# Patient Record
Sex: MALE | Race: WHITE | NOT HISPANIC OR LATINO | Employment: FULL TIME | URBAN - METROPOLITAN AREA
[De-identification: names, ages, dates, MRNs, and addresses within clinical notes are randomized per-mention and may not be internally consistent; named-entity substitution may affect disease eponyms.]

---

## 2017-11-22 ENCOUNTER — ALLSCRIPTS OFFICE VISIT (OUTPATIENT)
Dept: OTHER | Facility: OTHER | Age: 62
End: 2017-11-22

## 2017-11-22 LAB
BILIRUB UR QL STRIP: NORMAL
CLARITY UR: NORMAL
COLOR UR: NORMAL
GLUCOSE (HISTORICAL): NORMAL
HGB UR QL STRIP.AUTO: NORMAL
KETONES UR STRIP-MCNC: NORMAL MG/DL
LEUKOCYTE ESTERASE UR QL STRIP: NORMAL
NITRITE UR QL STRIP: NORMAL
PH UR STRIP.AUTO: 5.5 [PH]
PROT UR STRIP-MCNC: NORMAL MG/DL
SP GR UR STRIP.AUTO: 1.02
UROBILINOGEN UR QL STRIP.AUTO: 0.2

## 2018-01-15 NOTE — MISCELLANEOUS
Message  GI Reminder Recall [facility]:   Date: 12/19/2016   Dear Haven COBOS:     Review of our records shows you are due for the following: Colonoscopy  Please call the following office to schedule your appointment:   [address] [facility], [address], [place], [address] [place] [address]  [phone]  We look forward to hearing from you!      Sincerely,         Signatures  [facility]    Electronically signed by : Domitila Chaves, ; Dec 19 2016  3:19PM EST                       (Author)

## 2018-01-22 VITALS
WEIGHT: 248 LBS | BODY MASS INDEX: 32.87 KG/M2 | DIASTOLIC BLOOD PRESSURE: 74 MMHG | SYSTOLIC BLOOD PRESSURE: 132 MMHG | HEIGHT: 73 IN

## 2018-11-26 DIAGNOSIS — Z85.46 PERSONAL HISTORY OF MALIGNANT NEOPLASM OF PROSTATE: Primary | ICD-10-CM

## 2018-11-27 LAB — PSA SERPL-MCNC: <0.1 NG/ML

## 2018-12-03 RX ORDER — SIMVASTATIN 40 MG
TABLET ORAL
Refills: 0 | COMMUNITY
Start: 2018-10-25

## 2018-12-03 RX ORDER — SILDENAFIL CITRATE 20 MG/1
TABLET ORAL
COMMUNITY
End: 2022-04-25

## 2018-12-03 RX ORDER — FENOFIBRATE 145 MG/1
TABLET, COATED ORAL
Refills: 0 | COMMUNITY
Start: 2018-10-25

## 2018-12-03 RX ORDER — SERTRALINE HYDROCHLORIDE 100 MG/1
TABLET, FILM COATED ORAL
Refills: 1 | COMMUNITY
Start: 2018-11-20 | End: 2022-04-25

## 2018-12-06 ENCOUNTER — OFFICE VISIT (OUTPATIENT)
Dept: UROLOGY | Facility: MEDICAL CENTER | Age: 63
End: 2018-12-06
Payer: COMMERCIAL

## 2018-12-06 VITALS
SYSTOLIC BLOOD PRESSURE: 120 MMHG | BODY MASS INDEX: 33.46 KG/M2 | DIASTOLIC BLOOD PRESSURE: 70 MMHG | HEART RATE: 69 BPM | HEIGHT: 72 IN | WEIGHT: 247 LBS

## 2018-12-06 DIAGNOSIS — Z85.46 PERSONAL HISTORY OF MALIGNANT NEOPLASM OF PROSTATE: Primary | ICD-10-CM

## 2018-12-06 DIAGNOSIS — Z90.79 HISTORY OF RADICAL PROSTATECTOMY: ICD-10-CM

## 2018-12-06 PROCEDURE — 99214 OFFICE O/P EST MOD 30 MIN: CPT | Performed by: UROLOGY

## 2018-12-06 NOTE — PROGRESS NOTES
Assessment/Plan:      Diagnoses and all orders for this visit:    Personal history of malignant neoplasm of prostate  -     PSA Total, Diagnostic; Future    History of radical prostatectomy    Other orders  -     fenofibrate (TRICOR) 145 mg tablet; TK 1 T PO D  -     sertraline (ZOLOFT) 100 mg tablet; TK 1 T PO D  -     simvastatin (ZOCOR) 40 mg tablet; TK 1 T PO D  -     sildenafil (REVATIO) 20 mg tablet; Take by mouth  -     Omega-3 Fatty Acids (FISH OIL) 645 MG CAPS; Take 1 capsule by mouth daily          Subjective:  No complaints     Patient ID: Shazia Ford is a 59 y o  male  HPI   He is over 6 years (October 2012) after his laparoscopic robotic radical prostatectomy with lymph node dissection for a pT2c pN0, Wilkinson score 9=4+ 5 adenocarcinoma of the prostate  His PSA blood tests have been low since his prostate cancer treatment was completed  He denies any daytime urinary incontinence, however once in a while he does have some leakage at night when he needs to take his neuroleptic medication  He does not have any abnormal sensations were needing to urinate nor does he need to strain or bear down to started urinary stream   He states he has a good appetite and normal bowel function, he denies any weight loss or pain in new locations  He does have some ED problems which have been an issue for him  Review of Systems   Constitutional: Negative  HENT: Negative  Eyes: Negative  Respiratory: Negative  Cardiovascular: Negative  Gastrointestinal: Negative  Endocrine: Negative  Genitourinary: Negative  Musculoskeletal: Negative  Skin: Negative  Allergic/Immunologic: Negative  Neurological: Negative  Hematological: Negative  Psychiatric/Behavioral: Negative  Objective:     Physical Exam   Constitutional: He is oriented to person, place, and time  He appears well-developed and well-nourished  No distress  HENT:   Head: Normocephalic and atraumatic     Nose: Nose normal    Mouth/Throat: Oropharynx is clear and moist    Eyes: Pupils are equal, round, and reactive to light  Conjunctivae and EOM are normal  No scleral icterus  Neck: Normal range of motion  Neck supple  Cardiovascular: Normal rate, regular rhythm, normal heart sounds and intact distal pulses  No murmur heard  Pulmonary/Chest: Effort normal and breath sounds normal  No respiratory distress  He has no wheezes  He has no rales  Abdominal: Soft  Bowel sounds are normal  He exhibits no distension and no mass  There is no tenderness  Genitourinary: Rectum normal    Musculoskeletal: Normal range of motion  He exhibits no edema or tenderness  Lymphadenopathy:     He has no cervical adenopathy  Neurological: He is alert and oriented to person, place, and time  No cranial nerve deficit  Skin: Skin is warm and dry  No rash noted  No erythema  No pallor  Psychiatric: He has a normal mood and affect  His behavior is normal  Judgment and thought content normal    Nursing note and vitals reviewed          PSA from 11/26/2018 is less than 0 1

## 2019-12-04 LAB — PSA SERPL-MCNC: <0.1 NG/ML

## 2019-12-13 ENCOUNTER — OFFICE VISIT (OUTPATIENT)
Dept: UROLOGY | Facility: MEDICAL CENTER | Age: 64
End: 2019-12-13
Payer: COMMERCIAL

## 2019-12-13 VITALS
HEART RATE: 74 BPM | DIASTOLIC BLOOD PRESSURE: 72 MMHG | HEIGHT: 72 IN | BODY MASS INDEX: 35.76 KG/M2 | SYSTOLIC BLOOD PRESSURE: 120 MMHG | WEIGHT: 264 LBS

## 2019-12-13 DIAGNOSIS — R31.29 MICROSCOPIC HEMATURIA: ICD-10-CM

## 2019-12-13 DIAGNOSIS — Z85.46 PERSONAL HISTORY OF MALIGNANT NEOPLASM OF PROSTATE: Primary | ICD-10-CM

## 2019-12-13 LAB
SL AMB  POCT GLUCOSE, UA: NORMAL
SL AMB LEUKOCYTE ESTERASE,UA: NORMAL
SL AMB POCT BILIRUBIN,UA: NORMAL
SL AMB POCT BLOOD,UA: NORMAL
SL AMB POCT CLARITY,UA: CLEAR
SL AMB POCT COLOR,UA: YELLOW
SL AMB POCT KETONES,UA: NORMAL
SL AMB POCT NITRITE,UA: NORMAL
SL AMB POCT PH,UA: 5.5
SL AMB POCT SPECIFIC GRAVITY,UA: >=1.03
SL AMB POCT URINE PROTEIN: NORMAL
SL AMB POCT UROBILINOGEN: 0.2

## 2019-12-13 PROCEDURE — 81003 URINALYSIS AUTO W/O SCOPE: CPT | Performed by: UROLOGY

## 2019-12-13 PROCEDURE — 99214 OFFICE O/P EST MOD 30 MIN: CPT | Performed by: UROLOGY

## 2019-12-13 NOTE — PROGRESS NOTES
Assessment/Plan:      Diagnoses and all orders for this visit:    Personal history of malignant neoplasm of prostate  -     POCT urine dip auto non-scope  -     PSA Total, Diagnostic; Future    Microscopic hematuria  -     US kidney and bladder; Future    Other orders  -     diclofenac sodium (VOLTAREN) 50 mg EC tablet; TK 1 T PO BID          Subjective:  No complaints     Patient ID: Loretta Niño is a 62 y o  male  HPI  He is over 7 years (October 2012) after his laparoscopic robotic radical prostatectomy with lymph node dissection for a pT2c pN0, Tracy score 9=4+ 5 adenocarcinoma of the prostate  His PSA blood tests have been low since his prostate cancer treatment was completed  He denies any daytime urinary incontinence, however once in a while he does have some leakage at night when he needs to take his neuroleptic medication  He does not have any abnormal sensations where needing to urinate nor does he need to strain or bear down to started urinary stream   He states he has a good appetite and normal bowel function, he denies any weight loss or pain in new locations        He does have some ED problems which have not been an issue for him  Review of Systems   Constitutional: Negative  HENT: Negative  Eyes: Negative  Respiratory: Negative  Cardiovascular: Negative  Gastrointestinal: Negative  Endocrine: Negative  Genitourinary: Negative  Negative for hematuria  Musculoskeletal: Negative  Skin: Negative  Allergic/Immunologic: Negative  Neurological: Negative  Hematological: Negative  Psychiatric/Behavioral: Negative  Objective:     Physical Exam   Constitutional: He is oriented to person, place, and time  He appears well-developed and well-nourished  No distress  HENT:   Head: Normocephalic and atraumatic  Nose: Nose normal    Mouth/Throat: Oropharynx is clear and moist    Eyes: Pupils are equal, round, and reactive to light   Conjunctivae and EOM are normal  No scleral icterus  Neck: Normal range of motion  Neck supple  Cardiovascular: Normal rate, regular rhythm, normal heart sounds and intact distal pulses  No murmur heard  Pulmonary/Chest: Effort normal and breath sounds normal  No respiratory distress  He has no wheezes  He has no rales  Abdominal: Soft  Bowel sounds are normal  He exhibits no distension and no mass  There is no tenderness  Musculoskeletal: Normal range of motion  He exhibits no edema or tenderness  Lymphadenopathy:     He has no cervical adenopathy  Neurological: He is alert and oriented to person, place, and time  No cranial nerve deficit  Skin: Skin is warm and dry  No rash noted  No erythema  No pallor  Psychiatric: He has a normal mood and affect  His behavior is normal  Judgment and thought content normal    Nursing note and vitals reviewed          PSA, Total Screen    Ref Range & Units 12/3/19  8:35 AM    Prostate Specific Antigen Total < OR = 4 0 ng/mL <0 1

## 2019-12-30 ENCOUNTER — HOSPITAL ENCOUNTER (OUTPATIENT)
Dept: ULTRASOUND IMAGING | Facility: HOSPITAL | Age: 64
Discharge: HOME | End: 2019-12-30
Attending: UROLOGY
Payer: COMMERCIAL

## 2019-12-30 DIAGNOSIS — R31.29 MICROSCOPIC HEMATURIA: ICD-10-CM

## 2019-12-30 PROCEDURE — 76770 US EXAM ABDO BACK WALL COMP: CPT

## 2020-12-17 ENCOUNTER — TELEPHONE (OUTPATIENT)
Dept: UROLOGY | Facility: AMBULATORY SURGERY CENTER | Age: 65
End: 2020-12-17

## 2021-01-06 ENCOUNTER — TELEPHONE (OUTPATIENT)
Dept: UROLOGY | Facility: MEDICAL CENTER | Age: 66
End: 2021-01-06

## 2021-01-06 DIAGNOSIS — R97.20 ELEVATED PSA: Primary | ICD-10-CM

## 2021-01-06 NOTE — TELEPHONE ENCOUNTER
Patient of Dr Phong Alvarez   Patient appointment rescheduled until March  As per office notes patient needs psa order  Patient requesting order to be mailed to him as he goes to [facility]

## 2021-03-10 DIAGNOSIS — Z23 ENCOUNTER FOR IMMUNIZATION: ICD-10-CM

## 2021-03-16 ENCOUNTER — IMMUNIZATIONS (OUTPATIENT)
Dept: FAMILY MEDICINE CLINIC | Facility: HOSPITAL | Age: 66
End: 2021-03-16

## 2021-03-16 DIAGNOSIS — Z23 ENCOUNTER FOR IMMUNIZATION: Primary | ICD-10-CM

## 2021-03-16 PROCEDURE — 0001A SARS-COV-2 / COVID-19 MRNA VACCINE (PFIZER-BIONTECH) 30 MCG: CPT

## 2021-03-16 PROCEDURE — 91300 SARS-COV-2 / COVID-19 MRNA VACCINE (PFIZER-BIONTECH) 30 MCG: CPT

## 2021-04-05 ENCOUNTER — IMMUNIZATIONS (OUTPATIENT)
Dept: FAMILY MEDICINE CLINIC | Facility: HOSPITAL | Age: 66
End: 2021-04-05

## 2021-04-05 DIAGNOSIS — Z23 ENCOUNTER FOR IMMUNIZATION: Primary | ICD-10-CM

## 2021-04-05 PROCEDURE — 0002A SARS-COV-2 / COVID-19 MRNA VACCINE (PFIZER-BIONTECH) 30 MCG: CPT | Performed by: NURSE PRACTITIONER

## 2021-04-05 PROCEDURE — 91300 SARS-COV-2 / COVID-19 MRNA VACCINE (PFIZER-BIONTECH) 30 MCG: CPT | Performed by: NURSE PRACTITIONER

## 2021-04-19 ENCOUNTER — OFFICE VISIT (OUTPATIENT)
Dept: UROLOGY | Facility: AMBULATORY SURGERY CENTER | Age: 66
End: 2021-04-19
Payer: COMMERCIAL

## 2021-04-19 VITALS
BODY MASS INDEX: 34 KG/M2 | SYSTOLIC BLOOD PRESSURE: 120 MMHG | WEIGHT: 251 LBS | HEIGHT: 72 IN | HEART RATE: 80 BPM | DIASTOLIC BLOOD PRESSURE: 70 MMHG

## 2021-04-19 DIAGNOSIS — C61 PROSTATE CANCER (HCC): Primary | ICD-10-CM

## 2021-04-19 PROCEDURE — 3008F BODY MASS INDEX DOCD: CPT | Performed by: UROLOGY

## 2021-04-19 PROCEDURE — 99213 OFFICE O/P EST LOW 20 MIN: CPT | Performed by: UROLOGY

## 2021-04-19 RX ORDER — GABAPENTIN 100 MG/1
100 CAPSULE ORAL 3 TIMES DAILY
COMMUNITY

## 2021-04-19 NOTE — PROGRESS NOTES
4/19/2021    Albania Bee  1955  [id number]        Assessment    Tracy 9 prostate cancer status post RALP 2012    Plan    Patient would like to follow with his PCP as it is almost 10 years since his surgery  I think this is reasonable with undetectable PSA  Discussed the importance of annual PSA to ensure that it remains undetectable, especially because he is high risk for recurrence despite the fact that it has been almost 10 years  He understands and agrees to notify us at any time if PSA is not undetectable  Will copy his PCP so that he is aware that PSA should be checked annually  History of Present Illness  Cindy Lebron is a 60 y o  male  Patient of Dr James Rob status post RALP in 2012 found to have Tracy 9 prostate cancer  Per patient, margins were all negative  He has mild stress incontinence which has been the case since his surgery  He has no complaints however  He has had erectile dysfunction since surgery  He is not interested in treatment for this  He has no constitutional symptoms or other complaints  PSA remains undetectable  AUA SYMPTOM SCORE      Most Recent Value   AUA SYMPTOM SCORE   How often have you had a sensation of not emptying your bladder completely after you finished urinating? 0   How often have you had to urinate again less than two hours after you finished urinating? 1   How often have you found you stopped and started again several times when you urinate?  0   How often have you found it difficult to postpone urination? 1   How often have you had a weak urinary stream?  0   How often have you had to push or strain to begin urination? 0   How many times did you most typically get up to urinate from the time you went to bed at night until the time you got up in the morning?   1   Quality of Life: If you were to spend the rest of your life with your urinary condition just the way it is now, how would you feel about that?  2   AUA SYMPTOM SCORE  3 Review of Systems  Review of Systems   Constitutional: Negative  HENT: Negative  Respiratory: Negative  Cardiovascular: Negative  Gastrointestinal: Negative  Genitourinary:        As per HPI   Musculoskeletal: Negative  Skin: Negative  Neurological: Negative  Hematological: Negative            Past Medical History  Past Medical History:   Diagnosis Date    Acquired absence of other genital organ(s)     BPH without obstruction/lower urinary tract symptoms     Erectile dysfunction following radical prostatectomy     Kidney stone     Other hyperlipidemia     Personal history of malignant neoplasm of prostate     Urinary incontinence, stress, male        Past Social History  Past Surgical History:   Procedure Laterality Date    INGUINAL HERNIA REPAIR      LAPAROSCOPY W/ LYMPHADENECTOMY / SAMPLING / BIOPSY LYMPH NODE Bilateral 2012    PROSTATE BIOPSY  2012    PROSTATECTOMY  2012    WISDOM TOOTH EXTRACTION         Past Family History  Family History   Problem Relation Age of Onset   [employer] Breast cancer Mother     Parkinsonism Father     Cancer Other         bladder       Past Social history  Social History     Socioeconomic History    Marital status: /Civil Union     Spouse name: Not on file    Number of children: Not on file    Years of education: Not on file    Highest education level: Not on file   Occupational History    Not on file   Social Needs    Financial resource strain: Not on file    Food insecurity     Worry: Not on file     Inability: Not on file   [employer] needs     Medical: Not on file     Non-medical: Not on file   Tobacco Use    Smoking status: Never Smoker    Smokeless tobacco: Never Used   Substance and Sexual Activity    Alcohol use: Yes     Comment: social    Drug use: No    Sexual activity: Not on file   Lifestyle    Physical activity     Days per week: Not on file     Minutes per session: Not on file    Stress: Not on file Relationships    Social connections     Talks on phone: Not on file     Gets together: Not on file     Attends [employer]ist service: Not on file     Active member of club or organization: Not on file     Attends meetings of clubs or organizations: Not on file     Relationship status: Not on file    Intimate partner violence     Fear of current or ex partner: Not on file     Emotionally abused: Not on file     Physically abused: Not on file     Forced sexual activity: Not on file   Other Topics Concern    Not on file   Social History Narrative    Not on file     Social History     Tobacco Use   Smoking Status Never Smoker   Smokeless Tobacco Never Used       Current Medications  Current Outpatient Medications   Medication Sig Dispense Refill    fenofibrate (TRICOR) 145 mg tablet TK 1 T PO D  0    gabapentin (NEURONTIN) 100 mg capsule Take 100 mg by mouth 3 (three) times a day      Omega-3 Fatty Acids (FISH OIL) 645 MG CAPS Take 1 capsule by mouth daily      simvastatin (ZOCOR) 40 mg tablet TK 1 T PO D  0    diclofenac sodium (VOLTAREN) 50 mg EC tablet TK 1 T PO BID  1    sertraline (ZOLOFT) 100 mg tablet TK 1 T PO D  1    sildenafil (REVATIO) 20 mg tablet Take by mouth       No current facility-administered medications for this visit  Allergies  Allergies   Allergen Reactions    Other        Past Medical History, Social History, Family History, medications and allergies were reviewed  Vitals  Vitals:    04/19/21 1349   BP: 120/70   Pulse: 80   Weight: 114 kg (251 lb)   Height: 6' (1 829 m)       Physical Exam  Physical Exam  Vitals signs reviewed  Constitutional:       Appearance: He is well-developed  HENT:      Head: Normocephalic and atraumatic  Eyes:      Conjunctiva/sclera: Conjunctivae normal    Cardiovascular:      Rate and Rhythm: Normal rate  Pulmonary:      Effort: Pulmonary effort is normal    Abdominal:      Palpations: Abdomen is soft     Musculoskeletal: Normal range of motion  Skin:     General: Skin is warm and dry  Neurological:      Mental Status: He is alert and oriented to person, place, and time     Psychiatric:         Mood and Affect: Mood normal            Results  Lab Results   Component Value Date    PSA <0 1 12/03/2019    PSA <0 1 11/26/2018     No results found for: GLUCOSE, CALCIUM, NA, K, CO2, CL, BUN, CREATININE  No results found for: WBC, HGB, HCT, MCV, PLT

## 2021-04-19 NOTE — LETTER
April 19, 2021     Gregoria Velazquez MD  [address] [facility] [address] [id number]    Patient: Mireille Amato   YOB: 1955   Date of Visit: 4/19/2021       Dear Dr Kiersten Oneal:    Thank you for referring Miguelina Greenfield to me for evaluation  Below are my notes for this consultation  If you have questions, please do not hesitate to call me  I look forward to following your patient along with you  Sincerely,        Dominique Arrieta MD        CC: No Recipients  Dominique Arrieta MD  4/19/2021  2:19 PM  Signed  4/19/2021    Mireille Amato  1955  [id number]        Assessment    [place] 9 prostate cancer status post RALP 2012    Plan    Patient would like to follow with his PCP as it is almost 10 years since his surgery  I think this is reasonable with undetectable PSA  Discussed the importance of annual PSA to ensure that it remains undetectable, especially because he is high risk for recurrence despite the fact that it has been almost 10 years  He understands and agrees to notify us at any time if PSA is not undetectable  Will copy his PCP so that he is aware that PSA should be checked annually  History of Present Illness  Leitha Heimlich is a 60 y o  male  Patient of Dr Susan Howard status post RALP in 2012 found to have Tracy 9 prostate cancer  Per patient, margins were all negative  He has mild stress incontinence which has been the case since his surgery  He has no complaints however  He has had erectile dysfunction since surgery  He is not interested in treatment for this  He has no constitutional symptoms or other complaints  PSA remains undetectable  AUA SYMPTOM SCORE      Most Recent Value   AUA SYMPTOM SCORE   How often have you had a sensation of not emptying your bladder completely after you finished urinating? 0   How often have you had to urinate again less than two hours after you finished urinating?   1   How often have you found you stopped and started again several times when you urinate?  0   How often have you found it difficult to postpone urination? 1   How often have you had a weak urinary stream?  0   How often have you had to push or strain to begin urination? 0   How many times did you most typically get up to urinate from the time you went to bed at night until the time you got up in the morning? 1   Quality of Life: If you were to spend the rest of your life with your urinary condition just the way it is now, how would you feel about that?  2   AUA SYMPTOM SCORE  3          Review of Systems  Review of Systems   Constitutional: Negative  HENT: Negative  Respiratory: Negative  Cardiovascular: Negative  Gastrointestinal: Negative  Genitourinary:        As per HPI   Musculoskeletal: Negative  Skin: Negative  Neurological: Negative  Hematological: Negative            Past Medical History  Past Medical History:   Diagnosis Date    Acquired absence of other genital organ(s)     BPH without obstruction/lower urinary tract symptoms     Erectile dysfunction following radical prostatectomy     Kidney stone     Other hyperlipidemia     Personal history of malignant neoplasm of prostate     Urinary incontinence, stress, male        Past Social History  Past Surgical History:   Procedure Laterality Date    INGUINAL HERNIA REPAIR      LAPAROSCOPY W/ LYMPHADENECTOMY / SAMPLING / BIOPSY LYMPH NODE Bilateral 2012    PROSTATE BIOPSY  2012    PROSTATECTOMY  2012    WISDOM TOOTH EXTRACTION         Past Family History  Family History   Problem Relation Age of Onset   [employer] Breast cancer Mother     Parkinsonism Father     Cancer Other         bladder       Past Social history  Social History     Socioeconomic History    Marital status: /Civil Union     Spouse name: Not on file    Number of children: Not on file    Years of education: Not on file    Highest education level: Not on file   Occupational History    Not on file   Social Needs    Financial resource strain: Not on file    Food insecurity     Worry: Not on file     Inability: Not on file    Transportation needs     Medical: Not on file     Non-medical: Not on file   Tobacco Use    Smoking status: Never Smoker    Smokeless tobacco: Never Used   Substance and Sexual Activity    Alcohol use: Yes     Comment: social    Drug use: No    Sexual activity: Not on file   Lifestyle    Physical activity     Days per week: Not on file     Minutes per session: Not on file    Stress: Not on file   Relationships    Social connections     Talks on phone: Not on file     Gets together: Not on file     Attends Adventism service: Not on file     Active member of club or organization: Not on file     Attends meetings of clubs or organizations: Not on file     Relationship status: Not on file    Intimate partner violence     Fear of current or ex partner: Not on file     Emotionally abused: Not on file     Physically abused: Not on file     Forced sexual activity: Not on file   Other Topics Concern    Not on file   Social History Narrative    Not on file     Social History     Tobacco Use   Smoking Status Never Smoker   Smokeless Tobacco Never Used       Current Medications  Current Outpatient Medications   Medication Sig Dispense Refill    fenofibrate (TRICOR) 145 mg tablet TK 1 T PO D  0    gabapentin (NEURONTIN) 100 mg capsule Take 100 mg by mouth 3 (three) times a day      Omega-3 Fatty Acids (FISH OIL) 645 MG CAPS Take 1 capsule by mouth daily      simvastatin (ZOCOR) 40 mg tablet TK 1 T PO D  0    diclofenac sodium (VOLTAREN) 50 mg EC tablet TK 1 T PO BID  1    sertraline (ZOLOFT) 100 mg tablet TK 1 T PO D  1    sildenafil (REVATIO) 20 mg tablet Take by mouth       No current facility-administered medications for this visit          Allergies  Allergies   Allergen Reactions    Other        Past Medical History, Social History, Family History, medications and allergies were reviewed  Vitals  Vitals:    04/19/21 1349   BP: 120/70   Pulse: 80   Weight: 114 kg (251 lb)   Height: 6' (1 829 m)       Physical Exam  Physical Exam  Vitals signs reviewed  Constitutional:       Appearance: He is well-developed  HENT:      Head: Normocephalic and atraumatic  Eyes:      Conjunctiva/sclera: Conjunctivae normal    Cardiovascular:      Rate and Rhythm: Normal rate  Pulmonary:      Effort: Pulmonary effort is normal    Abdominal:      Palpations: Abdomen is soft  Musculoskeletal: Normal range of motion  Skin:     General: Skin is warm and dry  Neurological:      Mental Status: He is alert and oriented to person, place, and time     Psychiatric:         Mood and Affect: Mood normal            Results  Lab Results   Component Value Date    PSA <0 1 12/03/2019    PSA <0 1 11/26/2018     No results found for: GLUCOSE, CALCIUM, NA, K, CO2, CL, BUN, CREATININE  No results found for: WBC, HGB, HCT, MCV, PLT

## 2021-05-04 ENCOUNTER — OFFICE VISIT (OUTPATIENT)
Dept: PULMONOLOGY | Facility: CLINIC | Age: 66
End: 2021-05-04
Payer: COMMERCIAL

## 2021-05-04 VITALS
HEIGHT: 72 IN | TEMPERATURE: 97.7 F | BODY MASS INDEX: 34 KG/M2 | HEART RATE: 67 BPM | OXYGEN SATURATION: 97 % | WEIGHT: 251 LBS | SYSTOLIC BLOOD PRESSURE: 120 MMHG | DIASTOLIC BLOOD PRESSURE: 84 MMHG

## 2021-05-04 DIAGNOSIS — G47.33 OSA (OBSTRUCTIVE SLEEP APNEA): Primary | ICD-10-CM

## 2021-05-04 DIAGNOSIS — E66.9 OBESITY (BMI 30.0-34.9): ICD-10-CM

## 2021-05-04 PROCEDURE — 99213 OFFICE O/P EST LOW 20 MIN: CPT | Performed by: INTERNAL MEDICINE

## 2021-05-04 PROCEDURE — 3008F BODY MASS INDEX DOCD: CPT | Performed by: INTERNAL MEDICINE

## 2021-05-04 PROCEDURE — 1036F TOBACCO NON-USER: CPT | Performed by: INTERNAL MEDICINE

## 2021-05-04 NOTE — LETTER
May 4, 2021     Bita العلي MD  [address] [facility] [address] [id number]    Patient: Deny Joe   YOB: 1955   Date of Visit: 5/4/2021       Dear Dr Suki Dalton:    Thank you for referring Nikhil Cain to me for evaluation  Below are my notes for this consultation  If you have questions, please do not hesitate to call me  I look forward to following your patient along with you  Sincerely,        Medina Sanders MD        CC: No Recipients  Lisset Campbell MD  5/4/2021 12:02 PM  Attested  Assessment/Plan:    LAURA (obstructive sleep apnea)  Diagnosed with severe obstructive sleep apnea AHI 72 6 associated with severe sleep fragmentation and oxygen desaturation during sleep and a diagnostic overnight polysomnogram performed on November 7, 2013  Subsequently underwent a CPAP titration study on December 16, 2013 and was started on CPAP therapy   Has been compliant with CPAP with current pressure around 9 6 centimetre  Patient will continue to use CPAP  Compliance report reviewed thoroughly with patient    Obesity (BMI 30 0-34  9)  Patient has not been exercising well likely due to [place] 19 pandemic and sedentary lifestyle, however he stated that he will retire this month and will get enough time to work on his general health  Importance about lifestyle modifications and improvement in dietary habit was discussed in detail with him  He understood  His weight has been stable around 251 lb for the past couple of visits  Diagnoses and all orders for this visit:    LAURA (obstructive sleep apnea)    Obesity (BMI 30 0-34  9)        Subjective:  Patient was seen and examined by me in the room  Patient was sitting comfortably on the exam table    He was not in any acute distress      Answers for HPI/ROS submitted by the patient on 4/28/2021   Primary symptoms  Chronicity: chronic  When did you first notice your symptoms?: more than 1 year ago  How often do your symptoms occur?: daily  Do you have shortness of breath that occurs with effort or exertion?: Yes  Do you have ear congestion?: No  Do you have heartburn?: No  Do you have fatigue?: No  Do you have nasal congestion?: No  Do you have shortness of breath when lying flat?: No  Do you have shortness of breath when you wake up?: No  Do you have sweats?: No  Have you experienced weight loss?: No  Which of the following makes your symptoms worse?: pollen, strenuous activity  Which of the following makes your symptoms better?: rest       Patient ID: Evelyn Villalba is a 60 y o  male  With past medical history pertinent for obstructive sleep apnea, morbid obesity, hypercholesterolemia-mixed type chronic lower back ache presented to my clinic today for regular follow-up  He stated that his dog has not been feeling well for over 1 year, and ox condition deteriorated in past 4-5 months that has disturbed his sleep is well  He had been getting frequently almost 3-4 times in at night to take his dog out for a walk  He endorses some daytime fatigue, however he denies any morning headache, fever, chills, new changes or changes in his weight  He also endorses occasional non activity related chest pressure, which is nonradiating, associated with cold weather/temperature is, relieved on its own, nonpleuritic, nonreproducible  Since the pandemic has started, he has not been exercising well and also endorses some shortness of breath on exertion  However he denied any palpitations, syncope/presyncopal symptoms, visual changes, GI/ complaints  He has been compliant with CPAP  I have reviewed his CPAP compliance report which is 100% with AHI 3 3  The following portions of the patient's history were reviewed and updated as appropriate: allergies, current medications, past family history, past medical history, past social history, past surgical history and problem list     Review of Systems   Constitutional: Negative for appetite change and fever     HENT: Positive for sneezing  Negative for congestion, ear pain, postnasal drip, rhinorrhea, sore throat and trouble swallowing  Eyes: Negative for visual disturbance  Respiratory: Positive for shortness of breath  Negative for cough and choking  Cardiovascular: Positive for chest pain  Negative for palpitations and leg swelling  Genitourinary: Negative for dysuria, hematuria and urgency  Musculoskeletal: Positive for myalgias  Negative for arthralgias and back pain  Neurological: Negative for dizziness, syncope, weakness, light-headedness, numbness and headaches  Hematological: Negative for adenopathy  Psychiatric/Behavioral: Negative for agitation and confusion  Objective:      /84 (BP Location: Left arm, Patient Position: Sitting, Cuff Size: Adult)   Pulse 67   Temp 97 7 °F (36 5 °C) (Tympanic)   Ht 6' (1 829 m)   Wt 114 kg (251 lb)   SpO2 97%   BMI 34 04 kg/m²          Physical Exam  Vitals signs reviewed  Constitutional:       Appearance: Normal appearance  He is obese  HENT:      Head: Normocephalic and atraumatic  Eyes:      General: No scleral icterus  Extraocular Movements: Extraocular movements intact  Conjunctiva/sclera: Conjunctivae normal    Neck:      Musculoskeletal: Normal range of motion  Cardiovascular:      Rate and Rhythm: Normal rate and regular rhythm  Pulses: Normal pulses  Heart sounds: Normal heart sounds  Pulmonary:      Effort: Pulmonary effort is normal       Breath sounds: Normal breath sounds  Abdominal:      General: Bowel sounds are normal    Musculoskeletal: Normal range of motion  Right lower leg: No edema  Left lower leg: No edema  Skin:     General: Skin is warm and dry  Capillary Refill: Capillary refill takes less than 2 seconds  Neurological:      Mental Status: He is alert and oriented to person, place, and time  Mental status is at baseline     Psychiatric:         Mood and Affect: Mood normal  Attestation signed by Ulisses Rea MD at 5/4/2021 12:55 PM:  Patient with severe obstructive sleep apnea on CPAP therapy  Using the CPAP regularly and is comfortable with the mask and pressure  Getting clinical benefit from CPAP therapy  Motivated to continue on CPAP therapy  Good compliance and lower residual AHI  He is also obese and understands the need for weight reduction  Weight reduction can improve his sleep apnea  Chest clear to auscultation  Heart sounds normal   Hemodynamically stable

## 2021-05-04 NOTE — ASSESSMENT & PLAN NOTE
Patient has not been exercising well likely due to [place] 19 pandemic and sedentary lifestyle, however he stated that he will retire this month and will get enough time to work on his general health  Importance about lifestyle modifications and improvement in dietary habit was discussed in detail with him  He understood  His weight has been stable around 251 lb for the past couple of visits

## 2021-05-04 NOTE — PROGRESS NOTES
Assessment/Plan:    LAURA (obstructive sleep apnea)  Diagnosed with severe obstructive sleep apnea AHI 72 6 associated with severe sleep fragmentation and oxygen desaturation during sleep and a diagnostic overnight polysomnogram performed on November 7, 2013  Subsequently underwent a CPAP titration study on December 16, 2013 and was started on CPAP therapy   Has been compliant with CPAP with current pressure around 9 6 centimetre  Patient will continue to use CPAP  Compliance report reviewed thoroughly with patient    Obesity (BMI 30 0-34  9)  Patient has not been exercising well likely due to [place] 19 pandemic and sedentary lifestyle, however he stated that he will retire this month and will get enough time to work on his general health  Importance about lifestyle modifications and improvement in dietary habit was discussed in detail with him  He understood  His weight has been stable around 251 lb for the past couple of visits  Diagnoses and all orders for this visit:    LAURA (obstructive sleep apnea)    Obesity (BMI 30 0-34  9)        Subjective:  Patient was seen and examined by me in the room  Patient was sitting comfortably on the exam table    He was not in any acute distress      Answers for HPI/ROS submitted by the patient on 4/28/2021   Primary symptoms  Chronicity: chronic  When did you first notice your symptoms?: more than 1 year ago  How often do your symptoms occur?: daily  Do you have shortness of breath that occurs with effort or exertion?: Yes  Do you have ear congestion?: No  Do you have heartburn?: No  Do you have fatigue?: No  Do you have nasal congestion?: No  Do you have shortness of breath when lying flat?: No  Do you have shortness of breath when you wake up?: No  Do you have sweats?: No  Have you experienced weight loss?: No  Which of the following makes your symptoms worse?: pollen, strenuous activity  Which of the following makes your symptoms better?: rest       Patient ID: Lizz Sargent is a 60 y o  male  With past medical history pertinent for obstructive sleep apnea, morbid obesity, hypercholesterolemia-mixed type chronic lower back ache presented to my clinic today for regular follow-up  He stated that his dog has not been feeling well for over 1 year, and ox condition deteriorated in past 4-5 months that has disturbed his sleep is well  He had been getting frequently almost 3-4 times in at night to take his dog out for a walk  He endorses some daytime fatigue, however he denies any morning headache, fever, chills, new changes or changes in his weight  He also endorses occasional non activity related chest pressure, which is nonradiating, associated with cold weather/temperature is, relieved on its own, nonpleuritic, nonreproducible  Since the pandemic has started, he has not been exercising well and also endorses some shortness of breath on exertion  However he denied any palpitations, syncope/presyncopal symptoms, visual changes, GI/ complaints  He has been compliant with CPAP  I have reviewed his CPAP compliance report which is 100% with AHI 3 3  The following portions of the patient's history were reviewed and updated as appropriate: allergies, current medications, past family history, past medical history, past social history, past surgical history and problem list     Review of Systems   Constitutional: Negative for appetite change and fever  HENT: Positive for sneezing  Negative for congestion, ear pain, postnasal drip, rhinorrhea, sore throat and trouble swallowing  Eyes: Negative for visual disturbance  Respiratory: Positive for shortness of breath  Negative for cough and choking  Cardiovascular: Positive for chest pain  Negative for palpitations and leg swelling  Genitourinary: Negative for dysuria, hematuria and urgency  Musculoskeletal: Positive for myalgias  Negative for arthralgias and back pain     Neurological: Negative for dizziness, syncope, weakness, light-headedness, numbness and headaches  Hematological: Negative for adenopathy  Psychiatric/Behavioral: Negative for agitation and confusion  Objective:      /84 (BP Location: Left arm, Patient Position: Sitting, Cuff Size: Adult)   Pulse 67   Temp 97 7 °F (36 5 °C) (Tympanic)   Ht 6' (1 829 m)   Wt 114 kg (251 lb)   SpO2 97%   BMI 34 04 kg/m²          Physical Exam  Vitals signs reviewed  Constitutional:       Appearance: Normal appearance  He is obese  HENT:      Head: Normocephalic and atraumatic  Eyes:      General: No scleral icterus  Extraocular Movements: Extraocular movements intact  Conjunctiva/sclera: Conjunctivae normal    Neck:      Musculoskeletal: Normal range of motion  Cardiovascular:      Rate and Rhythm: Normal rate and regular rhythm  Pulses: Normal pulses  Heart sounds: Normal heart sounds  Pulmonary:      Effort: Pulmonary effort is normal       Breath sounds: Normal breath sounds  Abdominal:      General: Bowel sounds are normal    Musculoskeletal: Normal range of motion  Right lower leg: No edema  Left lower leg: No edema  Skin:     General: Skin is warm and dry  Capillary Refill: Capillary refill takes less than 2 seconds  Neurological:      Mental Status: He is alert and oriented to person, place, and time  Mental status is at baseline     Psychiatric:         Mood and Affect: Mood normal

## 2021-05-04 NOTE — ASSESSMENT & PLAN NOTE
Diagnosed with severe obstructive sleep apnea AHI 72 6 associated with severe sleep fragmentation and oxygen desaturation during sleep and a diagnostic overnight polysomnogram performed on November 7, 2013  Subsequently underwent a CPAP titration study on December 16, 2013 and was started on CPAP therapy   Has been compliant with CPAP with current pressure around 9 6 centimetre    Patient will continue to use CPAP  Compliance report reviewed thoroughly with patient

## 2021-06-15 ENCOUNTER — TELEPHONE (OUTPATIENT)
Dept: PULMONOLOGY | Facility: CLINIC | Age: 66
End: 2021-06-15

## 2021-06-15 NOTE — TELEPHONE ENCOUNTER
6/15 Pt called and provided info re recall of CPAP machines  Pt provided ph # for [employer], [employer], and indicated the message from this company advises pts to consult w/their Drs to see if they should continue to use their CPAP machines or not, based on their medical situation  Please review w/Dr Macho Cárdenas and advise the pt

## 2021-10-09 ENCOUNTER — IMMUNIZATIONS (OUTPATIENT)
Dept: FAMILY MEDICINE CLINIC | Facility: HOSPITAL | Age: 66
End: 2021-10-09

## 2021-10-09 DIAGNOSIS — Z23 ENCOUNTER FOR IMMUNIZATION: Primary | ICD-10-CM

## 2021-10-09 PROCEDURE — 91300 SARS-COV-2 / COVID-19 MRNA VACCINE (PFIZER-BIONTECH) 30 MCG: CPT

## 2021-10-09 PROCEDURE — 0001A SARS-COV-2 / COVID-19 MRNA VACCINE (PFIZER-BIONTECH) 30 MCG: CPT

## 2021-12-02 DIAGNOSIS — G47.33 OSA (OBSTRUCTIVE SLEEP APNEA): Primary | ICD-10-CM

## 2021-12-03 ENCOUNTER — TELEPHONE (OUTPATIENT)
Dept: PULMONOLOGY | Facility: CLINIC | Age: 66
End: 2021-12-03

## 2022-04-25 ENCOUNTER — OFFICE VISIT (OUTPATIENT)
Dept: PULMONOLOGY | Facility: CLINIC | Age: 67
End: 2022-04-25
Payer: MEDICARE

## 2022-04-25 VITALS
WEIGHT: 236 LBS | OXYGEN SATURATION: 97 % | BODY MASS INDEX: 31.97 KG/M2 | TEMPERATURE: 97.6 F | HEART RATE: 46 BPM | DIASTOLIC BLOOD PRESSURE: 78 MMHG | HEIGHT: 72 IN | SYSTOLIC BLOOD PRESSURE: 136 MMHG

## 2022-04-25 DIAGNOSIS — G47.30 SLEEP APNEA WITH USE OF CONTINUOUS POSITIVE AIRWAY PRESSURE (CPAP): Primary | ICD-10-CM

## 2022-04-25 DIAGNOSIS — E66.9 OBESITY (BMI 30.0-34.9): ICD-10-CM

## 2022-04-25 PROCEDURE — 99214 OFFICE O/P EST MOD 30 MIN: CPT | Performed by: INTERNAL MEDICINE

## 2022-04-25 RX ORDER — PREDNISONE 20 MG/1
TABLET ORAL
COMMUNITY
Start: 2022-04-19

## 2022-04-25 RX ORDER — CYCLOBENZAPRINE HCL 5 MG
TABLET ORAL
COMMUNITY
Start: 2022-04-10

## 2022-04-25 NOTE — PROGRESS NOTES
Assessment:    1  Sleep apnea with use of continuous positive airway pressure (CPAP)     2  Obesity (BMI 30 0-34  9)       Complaince chart from CPAP machine reviewed  On auto cPap with pressure raging from 7 1 to 10 mmHg  AHI 3 4 and within goal range  Patient reports good complaince, 100% on compliance data  Reports that he subjectively feels very well after using the CPAP  If he misses out on cpap use, the next day he would feel chest heaviness, interrupted sleep, headaches and overall uneasiness  He does admit that he missed on CPAP use for 2-3 days over last month mainly because he was not able to sleep comfortably because of the neck pain for which he is being treated with neurontin and steroids as per PCP which are helping  Plan:     -- continue cpap use as per current settings, patient has excellent compliance, uses full face mask  -- will encourage weight loss through physical activity, patient reports that he does cycling for 3 days/week  -- follow up in 6 months  Subjective:     Patient ID: Leonila Vera is a 73 y o  male  Chief Complaint:  HPI  The following portions of the patient's history were reviewed in this encounter and updated as appropriate:     Patient presents today for a follow up of sleep apnea on CPAP therapy  He reports no troubles with the machine or with compliance of the CPAP use  He denies any complaints related to the cpap or LAURA  He reports that the only time he was not able ot use the machine was 2-3 times this last month when he was not able to sleep comfortably due to neck pain, he is currently on steroid taper and neurontin for that and he reports improvement  Review of Systems   Constitutional: Positive for appetite change  Negative for fever  HENT: Negative for ear pain, postnasal drip, rhinorrhea, sneezing, sore throat and trouble swallowing  Eyes: Negative for visual disturbance  Respiratory: Negative for apnea, cough and shortness of breath  Cardiovascular: Negative for chest pain and leg swelling  Gastrointestinal: Negative for abdominal distention, diarrhea and vomiting  Endocrine: Negative for cold intolerance and heat intolerance  Genitourinary: Negative for difficulty urinating  Musculoskeletal: Positive for myalgias  Neurological: Negative for headaches  Psychiatric/Behavioral: Negative for agitation and confusion  Objective:    Physical Exam  Vitals and nursing note reviewed  Constitutional:       General: He is not in acute distress  Appearance: He is not toxic-appearing  HENT:      Head: Normocephalic  Mouth/Throat:      Mouth: Mucous membranes are moist    Eyes:      Extraocular Movements: Extraocular movements intact  Conjunctiva/sclera: Conjunctivae normal    Cardiovascular:      Rate and Rhythm: Normal rate and regular rhythm  Pulses: Normal pulses  Pulmonary:      Effort: Pulmonary effort is normal  No respiratory distress  Abdominal:      Palpations: Abdomen is soft  Tenderness: There is no abdominal tenderness  There is no guarding  Musculoskeletal:      Right lower leg: No edema  Left lower leg: No edema  Skin:     General: Skin is warm  Capillary Refill: Capillary refill takes less than 2 seconds  Coloration: Skin is not jaundiced  Neurological:      Mental Status: He is alert and oriented to person, place, and time  Mental status is at baseline     Psychiatric:         Mood and Affect: Mood normal          Behavior: Behavior normal      Answers for HPI/ROS submitted by the patient on 4/19/2022  Chronicity: chronic  When did you first notice your symptoms?: more than 1 year ago  How often do your symptoms occur?: daily  Since you first noticed this problem, how has it changed?: unchanged  Do you have shortness of breath that occurs with effort or exertion?: No  Do you have ear congestion?: No  Do you have heartburn?: No  Do you have fatigue?: No  Do you have nasal congestion?: No  Do you have shortness of breath when lying flat?: No  Do you have shortness of breath when you wake up?: No  Do you have sweats?: No  Have you experienced weight loss?: Yes  Which of the following makes your symptoms worse?: change in weather, minimal activity        Lab Review:   not applicable

## 2022-10-19 ENCOUNTER — OFFICE VISIT (OUTPATIENT)
Dept: PULMONOLOGY | Facility: CLINIC | Age: 67
End: 2022-10-19
Payer: MEDICARE

## 2022-10-19 VITALS
WEIGHT: 242 LBS | BODY MASS INDEX: 32.78 KG/M2 | OXYGEN SATURATION: 98 % | HEART RATE: 58 BPM | TEMPERATURE: 97.4 F | DIASTOLIC BLOOD PRESSURE: 84 MMHG | HEIGHT: 72 IN | SYSTOLIC BLOOD PRESSURE: 126 MMHG

## 2022-10-19 DIAGNOSIS — G47.33 OSA ON CPAP: Primary | ICD-10-CM

## 2022-10-19 DIAGNOSIS — E66.9 OBESITY (BMI 30.0-34.9): ICD-10-CM

## 2022-10-19 DIAGNOSIS — Z99.89 OSA ON CPAP: Primary | ICD-10-CM

## 2022-10-19 PROCEDURE — 99213 OFFICE O/P EST LOW 20 MIN: CPT | Performed by: INTERNAL MEDICINE

## 2022-10-19 RX ORDER — GABAPENTIN 300 MG/1
CAPSULE ORAL
COMMUNITY
Start: 2022-09-04

## 2022-10-19 NOTE — PROGRESS NOTES
Assessment/Plan:    LAURA on CPAP  Mr Clarence Sanchez was diagnosed with severe obstructive sleep apnea AHI 72 6 associated with severe sleep fragmentation and oxygen desaturation during sleep and a diagnostic overnight polysomnogram performed on November 7, 2013  He subsequently underwent a CPAP titration study on December 16, 2013 and was started on CPAP therapy  Currently he is on auto CPAP therapy with a minimum pressure of 4 cm of water and maximum 20 cm of water  His average pressure is 11 cm of water now  His residual AHI was low and leak was minimal   He is getting clinical benefit from CPAP therapy and is very motivated to continue  The CPAP therapy is medically necessary for him  I have advised him to continue as before  I had a long discussion with him and his wife and I have answered all their questions    Obesity (BMI 30 0-34  9)  He is obese and understands the need for weight reduction  He understands that weight reduction can significantly improve sleep apnea  Diagnoses and all orders for this visit:    LAURA on CPAP    Obesity (BMI 30 0-34 9)    Other orders  -     diclofenac sodium (VOLTAREN) 50 mg EC tablet  -     diclofenac sodium (VOLTAREN) 50 mg EC tablet  -     FEXOFENADINE HCL PO  -     gabapentin (NEURONTIN) 300 mg capsule  -     Glucosamine-Chondroitin--200-150 MG TABS          Subjective:      Patient ID: Mariza Ramos is a 78 y o  male  Mr Latrice haas came for follow-up for his obstructive sleep apnea on CPAP therapy  Currently he is using the CPAP every night and is comfortable with the mask and pressure  Has no significant daytime sleepiness or morning headache  I reviewed his CPAP compliance records and they are excellent  His residual AHI is low  He is very motivated to continue on CPAP therapy  He believes that he is getting benefit from CPAP therapy  He is on auto CPAP  His residual AHI is low  His leak is minimal   He has occasional cough mostly at night    No wheezing or shortness of breath or chest pain  He has history of allergies  Has occasional postnasal drip  He has sneezing  Currently is not on any inhaler or oxygen  He is mildly obese and understands need for weight reduction  The following portions of the patient's history were reviewed and updated as appropriate: allergies, current medications, past family history, past medical history, past social history, past surgical history and problem list     Review of Systems   Constitutional: Negative for appetite change, chills, fatigue and fever  HENT: Positive for postnasal drip and sneezing  Negative for ear pain, hearing loss, rhinorrhea, sore throat, trouble swallowing and voice change  Eyes: Negative for visual disturbance  Respiratory: Positive for cough  Negative for chest tightness, shortness of breath and wheezing  Cardiovascular: Negative for chest pain, palpitations and leg swelling  Gastrointestinal: Negative for abdominal pain, constipation, nausea and vomiting  Genitourinary: Negative for dysuria, frequency and urgency  Musculoskeletal: Positive for arthralgias and myalgias  Negative for gait problem  Skin: Negative for rash  Allergic/Immunologic: Positive for environmental allergies  Neurological: Negative for dizziness, seizures, syncope, light-headedness and headaches  Psychiatric/Behavioral: Negative for agitation, confusion and sleep disturbance  The patient is not nervous/anxious  Objective:      /84   Pulse 58   Temp (!) 97 4 °F (36 3 °C)   Ht 6' (1 829 m)   Wt 110 kg (242 lb)   SpO2 98%   BMI 32 82 kg/m²          Physical Exam  Vitals reviewed  Constitutional:       General: He is not in acute distress  Appearance: He is not ill-appearing, toxic-appearing or diaphoretic  HENT:      Head: Normocephalic  Mouth/Throat:      Mouth: Mucous membranes are moist    Eyes:      General: No scleral icterus       Conjunctiva/sclera: Conjunctivae normal    Cardiovascular:      Rate and Rhythm: Normal rate and regular rhythm  Heart sounds: Normal heart sounds  No murmur heard  Pulmonary:      Effort: Pulmonary effort is normal  No respiratory distress  Breath sounds: Normal breath sounds  No wheezing, rhonchi or rales  Abdominal:      General: Bowel sounds are normal       Palpations: Abdomen is soft  Tenderness: There is no abdominal tenderness  There is no guarding  Musculoskeletal:      Cervical back: No rigidity  Right lower leg: No edema  Left lower leg: No edema  Lymphadenopathy:      Cervical: No cervical adenopathy  Skin:     Coloration: Skin is not jaundiced or pale  Findings: No rash  Neurological:      Mental Status: He is alert and oriented to person, place, and time  Gait: Gait normal    Psychiatric:         Mood and Affect: Mood normal          Behavior: Behavior normal          Thought Content:  Thought content normal          Judgment: Judgment normal          Answers for HPI/ROS submitted by the patient on 10/17/2022  Do you have difficulty breathing?: Yes  Chronicity: chronic  When did you first notice your symptoms?: more than 1 year ago  How often do your symptoms occur?: daily  Since you first noticed this problem, how has it changed?: gradually improving  Do you have shortness of breath that occurs with effort or exertion?: Yes  Do you have ear congestion?: No  Do you have heartburn?: No  Do you have fatigue?: Yes  Do you have nasal congestion?: Yes  Do you have shortness of breath when lying flat?: No  Do you have shortness of breath when you wake up?: No  Do you have sweats?: No  Have you experienced weight loss?: No  Which of the following makes your symptoms worse?: change in weather, climbing stairs, exercise, strenuous activity  Which of the following makes your symptoms better?: rest

## 2022-10-19 NOTE — ASSESSMENT & PLAN NOTE
Álvaro Miranda was diagnosed with severe obstructive sleep apnea AHI 72 6 associated with severe sleep fragmentation and oxygen desaturation during sleep and a diagnostic overnight polysomnogram performed on November 7, 2013  He subsequently underwent a CPAP titration study on December 16, 2013 and was started on CPAP therapy  Currently he is on auto CPAP therapy with a minimum pressure of 4 cm of water and maximum 20 cm of water  His average pressure is 11 cm of water now  His residual AHI was low and leak was minimal   He is getting clinical benefit from CPAP therapy and is very motivated to continue  The CPAP therapy is medically necessary for him  I have advised him to continue as before    I had a long discussion with him and his wife and I have answered all their questions

## 2023-01-18 ENCOUNTER — TELEPHONE (OUTPATIENT)
Dept: GASTROENTEROLOGY | Facility: CLINIC | Age: 68
End: 2023-01-18

## 2023-01-18 ENCOUNTER — OFFICE VISIT (OUTPATIENT)
Dept: GASTROENTEROLOGY | Facility: CLINIC | Age: 68
End: 2023-01-18

## 2023-01-18 VITALS
SYSTOLIC BLOOD PRESSURE: 122 MMHG | HEART RATE: 68 BPM | BODY MASS INDEX: 32.05 KG/M2 | WEIGHT: 236.6 LBS | HEIGHT: 72 IN | DIASTOLIC BLOOD PRESSURE: 72 MMHG

## 2023-01-18 DIAGNOSIS — Z86.010 HISTORY OF ADENOMATOUS POLYP OF COLON: Primary | ICD-10-CM

## 2023-01-18 DIAGNOSIS — Z86.010 HISTORY OF ADENOMATOUS POLYP OF COLON: ICD-10-CM

## 2023-01-18 RX ORDER — POLYETHYLENE GLYCOL 3350, SODIUM SULFATE ANHYDROUS, SODIUM BICARBONATE, SODIUM CHLORIDE, POTASSIUM CHLORIDE 236; 22.74; 6.74; 5.86; 2.97 G/4L; G/4L; G/4L; G/4L; G/4L
4000 POWDER, FOR SOLUTION ORAL ONCE
Qty: 4000 ML | Refills: 0 | Status: SHIPPED | OUTPATIENT
Start: 2023-01-18 | End: 2023-01-18 | Stop reason: SDUPTHER

## 2023-01-18 RX ORDER — POLYETHYLENE GLYCOL 3350, SODIUM SULFATE ANHYDROUS, SODIUM BICARBONATE, SODIUM CHLORIDE, POTASSIUM CHLORIDE 236; 22.74; 6.74; 5.86; 2.97 G/4L; G/4L; G/4L; G/4L; G/4L
4000 POWDER, FOR SOLUTION ORAL ONCE
Qty: 4000 ML | Refills: 0 | Status: SHIPPED | OUTPATIENT
Start: 2023-01-18 | End: 2023-02-03 | Stop reason: ALTCHOICE

## 2023-01-18 NOTE — PROGRESS NOTES
[facility] - Outpatient Follow-up Note  Tone Stone 78 y o  male MRN: [id number]  Encounter: [id number]          ASSESSMENT AND PLAN:      1  History of adenomatous polyp of colon  We will plan surveillance colonoscopy using an extended prep  Recommend maximizing dietary fiber intake    - Colonoscopy; Future  - polyethylene glycol (Golytely) 4000 mL solution; Take 4,000 mL by mouth once for 1 dose Take 4000 mL by mouth once for 1 dose  Use as directed  Dispense: 4000 mL; Refill: 0    ______________________________________________________________________    SUBJECTIVE: With a history of adenomatous polyp on colonoscopy in 2018 with suboptimal prep presents today to arrange surveillance  Has had no symptoms, namely no abdominal pain, change in bowel habit, unexplained weight loss, blood in his stool  Does have some frequent, chronic hard stool  No odynophagia, dysphagia, nausea or vomiting, fever chills, jaundice or rashes      REVIEW OF SYSTEMS:    Review of Systems   Gastrointestinal: Positive for abdominal pain      Answers for HPI/ROS submitted by the patient on 1/11/2023  Pain - numeric: 0/10  arthralgias: Yes          Historical Information   Past Medical History:   Diagnosis Date   • Acquired absence of other genital organ(s)    • Allergic rhinitis    • Anxiety    • BPH without obstruction/lower urinary tract symptoms    • Erectile dysfunction following radical prostatectomy    • Hyperlipidemia    • Kidney stone    • Obesity    • Other hyperlipidemia    • Personal history of malignant neoplasm of prostate    • Sleep apnea with use of continuous positive airway pressure (CPAP)    • Sleep apnea, obstructive     use CPAP   • Urinary incontinence, stress, male      Past Surgical History:   Procedure Laterality Date   • COLONOSCOPY  09/06/2018    Dr Adryan Hedrick   • COLONOSCOPY  2013   • INGUINAL HERNIA REPAIR     • LAPAROSCOPY W/ LYMPHADENECTOMY / SAMPLING / BIOPSY LYMPH NODE Bilateral    • PROSTATE BIOPSY     • PROSTATECTOMY     • TONSILLECTOMY     • WISDOM TOOTH EXTRACTION       Social History   Social History     Substance and Sexual Activity   Alcohol Use Yes   • Alcohol/week: 4 0 standard drinks   • Types: 3 Glasses of wine, 1 Standard drinks or equivalent per week    Comment: varies     Social History     Substance and Sexual Activity   Drug Use No     Social History     Tobacco Use   Smoking Status Never   Smokeless Tobacco Never     Family History   Problem Relation Age of Onset   • Breast cancer Mother    • Cancer Mother            • Parkinsonism Father    • Cancer Other         bladder       Meds/Allergies       Current Outpatient Medications:   •  diclofenac sodium (VOLTAREN) 50 mg EC tablet  •  fenofibrate (TRICOR) 145 mg tablet  •  FEXOFENADINE HCL PO  •  gabapentin (NEURONTIN) 100 mg capsule  •  Glucosamine-Chondroitin--200-150 MG TABS  •  polyethylene glycol (Golytely) 4000 mL solution  •  simvastatin (ZOCOR) 40 mg tablet  •  diclofenac sodium (VOLTAREN) 50 mg EC tablet  •  gabapentin (NEURONTIN) 300 mg capsule    No Known Allergies        Objective     Blood pressure 122/72, pulse 68, height 6' (1 829 m), weight 107 kg (236 lb 9 6 oz)  Body mass index is 32 09 kg/m²  PHYSICAL EXAM:      Physical Exam     Lab Results:   No visits with results within 1 Day(s) from this visit  Latest known visit with results is:   Office Visit on 2019   Component Date Value   •  COLOR,UA 2019 yellow    • CLARITY,UA 2019 clear    • SPECIFIC GRAVITY,UA 2019 >=1 030    •  PH,UA 2019 5 5    • LEUKOCYTE ESTERASE,UA 2019 trace    • NITRITE,UA 2019 neg    • GLUCOSE, UA 2019 neg    • KETONES,UA 2019 neg    • BILIRUBIN,UA 2019 neg    • BLOOD,UA 2019 trace-intact    • POCT URINE PROTEIN 2019 neg    • SL AMB POCT UROBILINOGEN 2019 0 2          Radiology Results:   No results found

## 2023-01-18 NOTE — H&P (VIEW-ONLY)
[facility] - Outpatient Follow-up Note  Kassandra Garcia 78 y o  male MRN: [id number]  Encounter: [id number]          ASSESSMENT AND PLAN:      1  History of adenomatous polyp of colon  We will plan surveillance colonoscopy using an extended prep  Recommend maximizing dietary fiber intake    - Colonoscopy; Future  - polyethylene glycol (Golytely) 4000 mL solution; Take 4,000 mL by mouth once for 1 dose Take 4000 mL by mouth once for 1 dose  Use as directed  Dispense: 4000 mL; Refill: 0    ______________________________________________________________________    SUBJECTIVE: With a history of adenomatous polyp on colonoscopy in 2018 with suboptimal prep presents today to arrange surveillance  Has had no symptoms, namely no abdominal pain, change in bowel habit, unexplained weight loss, blood in his stool  Does have some frequent, chronic hard stool  No odynophagia, dysphagia, nausea or vomiting, fever chills, jaundice or rashes      REVIEW OF SYSTEMS:    Review of Systems   Gastrointestinal: Positive for abdominal pain      Answers for HPI/ROS submitted by the patient on 1/11/2023  Pain - numeric: 0/10  arthralgias: Yes          Historical Information   Past Medical History:   Diagnosis Date   • Acquired absence of other genital organ(s)    • Allergic rhinitis    • Anxiety    • BPH without obstruction/lower urinary tract symptoms    • Erectile dysfunction following radical prostatectomy    • Hyperlipidemia    • Kidney stone    • Obesity    • Other hyperlipidemia    • Personal history of malignant neoplasm of prostate    • Sleep apnea with use of continuous positive airway pressure (CPAP)    • Sleep apnea, obstructive     use CPAP   • Urinary incontinence, stress, male      Past Surgical History:   Procedure Laterality Date   • COLONOSCOPY  09/06/2018    Dr Misha Spivey   • COLONOSCOPY  2013   • INGUINAL HERNIA REPAIR     • LAPAROSCOPY W/ LYMPHADENECTOMY / SAMPLING / BIOPSY LYMPH NODE Bilateral    • PROSTATE BIOPSY     • PROSTATECTOMY     • TONSILLECTOMY     • WISDOM TOOTH EXTRACTION       Social History   Social History     Substance and Sexual Activity   Alcohol Use Yes   • Alcohol/week: 4 0 standard drinks   • Types: 3 Glasses of wine, 1 Standard drinks or equivalent per week    Comment: varies     Social History     Substance and Sexual Activity   Drug Use No     Social History     Tobacco Use   Smoking Status Never   Smokeless Tobacco Never     Family History   Problem Relation Age of Onset   • Breast cancer Mother    • Cancer Mother            • Parkinsonism Father    • Cancer Other         bladder       Meds/Allergies       Current Outpatient Medications:   •  diclofenac sodium (VOLTAREN) 50 mg EC tablet  •  fenofibrate (TRICOR) 145 mg tablet  •  FEXOFENADINE HCL PO  •  gabapentin (NEURONTIN) 100 mg capsule  •  Glucosamine-Chondroitin--200-150 MG TABS  •  polyethylene glycol (Golytely) 4000 mL solution  •  simvastatin (ZOCOR) 40 mg tablet  •  diclofenac sodium (VOLTAREN) 50 mg EC tablet  •  gabapentin (NEURONTIN) 300 mg capsule    No Known Allergies        Objective     Blood pressure 122/72, pulse 68, height 6' (1 829 m), weight 107 kg (236 lb 9 6 oz)  Body mass index is 32 09 kg/m²  PHYSICAL EXAM:      Physical Exam     Lab Results:   No visits with results within 1 Day(s) from this visit  Latest known visit with results is:   Office Visit on 2019   Component Date Value   •  COLOR,UA 2019 yellow    • CLARITY,UA 2019 clear    • SPECIFIC GRAVITY,UA 2019 >=1 030    •  PH,UA 2019 5 5    • LEUKOCYTE ESTERASE,UA 2019 trace    • NITRITE,UA 2019 neg    • GLUCOSE, UA 2019 neg    • KETONES,UA 2019 neg    • BILIRUBIN,UA 2019 neg    • BLOOD,UA 2019 trace-intact    • POCT URINE PROTEIN 2019 neg    • SL AMB POCT UROBILINOGEN 2019 0 2          Radiology Results:   No results found

## 2023-01-19 ENCOUNTER — TELEPHONE (OUTPATIENT)
Dept: GASTROENTEROLOGY | Facility: CLINIC | Age: 68
End: 2023-01-19

## 2023-01-19 NOTE — TELEPHONE ENCOUNTER
Pt called to reschedule procedure    Dr Marisa Carreon   2/3/23  [facility]

## 2023-01-20 ENCOUNTER — ANESTHESIA EVENT (OUTPATIENT)
Dept: ANESTHESIOLOGY | Facility: AMBULATORY SURGERY CENTER | Age: 68
End: 2023-01-20

## 2023-01-20 ENCOUNTER — ANESTHESIA (OUTPATIENT)
Dept: ANESTHESIOLOGY | Facility: AMBULATORY SURGERY CENTER | Age: 68
End: 2023-01-20

## 2023-02-01 ENCOUNTER — TELEPHONE (OUTPATIENT)
Dept: GASTROENTEROLOGY | Facility: CLINIC | Age: 68
End: 2023-02-01

## 2023-02-01 NOTE — TELEPHONE ENCOUNTER
lmom confirming pt's colonoscopy scheduled on 2/3/23 at [facility] with Dr Radha Byers   Informed [facility] would be calling the day prior with the arrival time  Informed of clear liquid diet day prior as well as the bowel cleansing preparation  Informed would need a  the day of the procedure due to being under sedation  I asked pt to please call back if has not received instructions or if has any questions

## 2023-02-02 RX ORDER — SODIUM CHLORIDE, SODIUM LACTATE, POTASSIUM CHLORIDE, CALCIUM CHLORIDE 600; 310; 30; 20 MG/100ML; MG/100ML; MG/100ML; MG/100ML
125 INJECTION, SOLUTION INTRAVENOUS CONTINUOUS
Status: CANCELLED | OUTPATIENT
Start: 2023-02-02

## 2023-02-02 RX ORDER — LIDOCAINE HYDROCHLORIDE 10 MG/ML
0.5 INJECTION, SOLUTION EPIDURAL; INFILTRATION; INTRACAUDAL; PERINEURAL ONCE AS NEEDED
Status: CANCELLED | OUTPATIENT
Start: 2023-02-02

## 2023-02-03 ENCOUNTER — ANESTHESIA EVENT (OUTPATIENT)
Dept: GASTROENTEROLOGY | Facility: AMBULATORY SURGERY CENTER | Age: 68
End: 2023-02-03

## 2023-02-03 ENCOUNTER — ANESTHESIA (OUTPATIENT)
Dept: GASTROENTEROLOGY | Facility: AMBULATORY SURGERY CENTER | Age: 68
End: 2023-02-03

## 2023-02-03 ENCOUNTER — HOSPITAL ENCOUNTER (OUTPATIENT)
Dept: GASTROENTEROLOGY | Facility: AMBULATORY SURGERY CENTER | Age: 68
Discharge: HOME | End: 2023-02-03

## 2023-02-03 VITALS
RESPIRATION RATE: 18 BRPM | HEIGHT: 73 IN | TEMPERATURE: 96.7 F | WEIGHT: 236 LBS | HEART RATE: 68 BPM | DIASTOLIC BLOOD PRESSURE: 78 MMHG | OXYGEN SATURATION: 98 % | BODY MASS INDEX: 31.28 KG/M2 | SYSTOLIC BLOOD PRESSURE: 124 MMHG

## 2023-02-03 DIAGNOSIS — Z86.010 HISTORY OF ADENOMATOUS POLYP OF COLON: ICD-10-CM

## 2023-02-03 RX ORDER — SODIUM CHLORIDE 9 MG/ML
100 INJECTION, SOLUTION INTRAVENOUS CONTINUOUS
Status: CANCELLED | OUTPATIENT
Start: 2023-02-03

## 2023-02-03 RX ORDER — LIDOCAINE HYDROCHLORIDE 10 MG/ML
0.5 INJECTION, SOLUTION EPIDURAL; INFILTRATION; INTRACAUDAL; PERINEURAL ONCE AS NEEDED
Status: DISCONTINUED | OUTPATIENT
Start: 2023-02-03 | End: 2023-02-07 | Stop reason: HOSPADM

## 2023-02-03 RX ORDER — PROPOFOL 10 MG/ML
INJECTION, EMULSION INTRAVENOUS AS NEEDED
Status: DISCONTINUED | OUTPATIENT
Start: 2023-02-03 | End: 2023-02-03

## 2023-02-03 RX ORDER — SODIUM CHLORIDE 9 MG/ML
INJECTION, SOLUTION INTRAVENOUS CONTINUOUS PRN
Status: DISCONTINUED | OUTPATIENT
Start: 2023-02-03 | End: 2023-02-03

## 2023-02-03 RX ORDER — SODIUM CHLORIDE, SODIUM LACTATE, POTASSIUM CHLORIDE, CALCIUM CHLORIDE 600; 310; 30; 20 MG/100ML; MG/100ML; MG/100ML; MG/100ML
20 INJECTION, SOLUTION INTRAVENOUS CONTINUOUS
Status: CANCELLED | OUTPATIENT
Start: 2023-02-03

## 2023-02-03 RX ADMIN — PROPOFOL 40 MG: 10 INJECTION, EMULSION INTRAVENOUS at 10:43

## 2023-02-03 RX ADMIN — PROPOFOL 30 MG: 10 INJECTION, EMULSION INTRAVENOUS at 10:58

## 2023-02-03 RX ADMIN — PROPOFOL 50 MG: 10 INJECTION, EMULSION INTRAVENOUS at 10:47

## 2023-02-03 RX ADMIN — PROPOFOL 20 MG: 10 INJECTION, EMULSION INTRAVENOUS at 10:41

## 2023-02-03 RX ADMIN — PROPOFOL 30 MG: 10 INJECTION, EMULSION INTRAVENOUS at 10:49

## 2023-02-03 RX ADMIN — PROPOFOL 30 MG: 10 INJECTION, EMULSION INTRAVENOUS at 10:53

## 2023-02-03 RX ADMIN — PROPOFOL 80 MG: 10 INJECTION, EMULSION INTRAVENOUS at 10:40

## 2023-02-03 RX ADMIN — SODIUM CHLORIDE: 9 INJECTION, SOLUTION INTRAVENOUS at 10:16

## 2023-02-03 RX ADMIN — PROPOFOL 20 MG: 10 INJECTION, EMULSION INTRAVENOUS at 10:59

## 2023-02-03 NOTE — ANESTHESIA PREPROCEDURE EVALUATION
Procedure:  COLONOSCOPY    Relevant Problems   ANESTHESIA (within normal limits)      CARDIO   (+) Hyperlipidemia      ENDO (within normal limits)      NEURO/PSYCH   (+) Personal history of malignant neoplasm of prostate      PULMONARY   (+) LAURA on CPAP      Other   (+) Obesity (BMI 30 0-34  9)        Physical Exam    Airway    Mallampati score: II  TM Distance: >3 FB  Neck ROM: full     Dental   No notable dental hx     Cardiovascular      Pulmonary      Other Findings        Anesthesia Plan  ASA Score- 2     Anesthesia Type- IV sedation with anesthesia with ASA Monitors  Additional Monitors:   Airway Plan:           Plan Factors-Exercise tolerance (METS): >4 METS  Chart reviewed  Existing labs reviewed  Patient summary reviewed  Patient is not a current smoker  Induction-     Postoperative Plan-     Informed Consent- Anesthetic plan and risks discussed with patient  I personally reviewed this patient with the CRNA  Discussed and agreed on the Anesthesia Plan with the CRNA  Yue Holland

## 2023-02-03 NOTE — ANESTHESIA POSTPROCEDURE EVALUATION
Post-Op Assessment Note    CV Status:  Stable  Pain Score: 0    Pain management: adequate     Mental Status:  Alert and awake   Hydration Status:  Euvolemic   PONV Controlled:  Controlled   Airway Patency:  Patent      Post Op Vitals Reviewed: Yes      Staff: CRNA         No notable events documented      BP   107/63   Temp     Pulse  64   Resp   18   SpO2   98 ra

## 2023-02-03 NOTE — INTERVAL H&P NOTE
H&P reviewed  After examining the patient I find no changes in the patients condition since the H&P had been written      Vitals:    02/03/23 1011   BP: 137/98   Pulse: 91   Resp: 18   Temp: (!) 96 7 °F (35 9 °C)   SpO2: 97%

## 2023-10-19 ENCOUNTER — OFFICE VISIT (OUTPATIENT)
Dept: PULMONOLOGY | Facility: CLINIC | Age: 68
End: 2023-10-19

## 2023-10-19 VITALS
DIASTOLIC BLOOD PRESSURE: 80 MMHG | BODY MASS INDEX: 29.03 KG/M2 | TEMPERATURE: 97.8 F | HEIGHT: 73 IN | SYSTOLIC BLOOD PRESSURE: 124 MMHG | HEART RATE: 78 BPM | OXYGEN SATURATION: 98 % | WEIGHT: 219 LBS

## 2023-10-19 DIAGNOSIS — U07.1 COVID-19: ICD-10-CM

## 2023-10-19 DIAGNOSIS — E66.9 OBESITY (BMI 30.0-34.9): ICD-10-CM

## 2023-10-19 DIAGNOSIS — G47.33 OSA ON CPAP: Primary | ICD-10-CM

## 2023-10-19 RX ORDER — PREDNISONE 10 MG/1
TABLET ORAL
COMMUNITY
Start: 2023-10-16

## 2023-10-19 RX ORDER — CEPHALEXIN 500 MG/1
CAPSULE ORAL
COMMUNITY
Start: 2023-10-16

## 2023-10-19 NOTE — PROGRESS NOTES
Assessment/Plan:    LAURA on CPAP  Mr.James Lisa Pimentel was diagnosed with severe obstructive sleep apnea AHI 72.6 associated with severe sleep fragmentation and oxygen desaturation during sleep and a diagnostic overnight polysomnogram performed on November 7, 2013. He subsequently underwent a CPAP titration study on December 16, 2013 and was started on CPAP therapy. Currently he is on auto CPAP therapy with a minimum pressure of 4 cm of water and maximum 20 cm of water. His average pressure is 11 cm of water now. His residual AHI was low and leak minimal.  He is getting clinical benefit from CPAP therapy and is very motivated to continue. The CPAP therapy is medically necessary for him. I have advised him to continue as before. I had a long discussion with him and his wife and I have answered all their questions. They stated that they are planning to move to [place] soon. Obesity (BMI 30.0-34. 9)  He is obese and understands the need for weight reduction. He understands that weight reduction can significantly improve sleep apnea. COVID-19  He recently had COVID-19 faction. He was treated with Paxlovid. He was also given steroid therapy. Currently he is feeling better. He has occasional cough. His chest was clear to auscultation. Diagnoses and all orders for this visit:    LAURA on CPAP    Obesity (BMI 30.0-34. 9)    COVID-19    Other orders  -     predniSONE 10 mg tablet  -     cephalexin (KEFLEX) 500 mg capsule          Subjective:      Patient ID: Christina Hathaway is a 80 y.o. male. Both Brittney Raygoza and his wife were on a cruise to [facility] and and got COVID to her send of the trip. He got treatment with Paxlovid and also given steroid. Currently he is feeling better though he does not feel he is back to his baseline yet. He has occasional cough. No wheezing. No shortness of breath. No fever or chills. Appetite has been good. He has obstructive sleep apnea and has been on CPAP therapy.   Currently he is using the CPAP every night and is comfortable with the mask and pressure. He has no significant daytime sleepiness or morning headache. He is very motivated to continue on CPAP therapy. I reviewed his CPAP compliance records and they are excellent. His residual AHI was low. He has no swelling of feet chest pain or palpitations. He has been getting supplies regularly from his DME. They are planning to move to [facility]. The following portions of the patient's history were reviewed and updated as appropriate: allergies, current medications, past family history, past medical history, past social history, past surgical history, and problem list.    Review of Systems   Constitutional:  Positive for fatigue. Negative for chills and fever. HENT:  Positive for rhinorrhea and voice change. Negative for hearing loss, sneezing, sore throat and trouble swallowing. Eyes:  Negative for visual disturbance. Respiratory:  Positive for shortness of breath. Negative for cough, chest tightness and wheezing. Cardiovascular:  Negative for chest pain, palpitations and leg swelling. Gastrointestinal:  Negative for abdominal pain, constipation, diarrhea, nausea and vomiting. Genitourinary:  Negative for dysuria, frequency and urgency. Musculoskeletal:  Positive for arthralgias. Negative for back pain and gait problem. Skin:  Negative for rash. Allergic/Immunologic: Positive for environmental allergies. Neurological:  Negative for dizziness, syncope, light-headedness and headaches. Psychiatric/Behavioral:  Negative for agitation, confusion and sleep disturbance. The patient is nervous/anxious. Objective:      /80   Pulse 78   Temp 97.8 °F (36.6 °C)   Ht 6' 1" (1.854 m)   Wt 99.3 kg (219 lb)   SpO2 98%   BMI 28.89 kg/m²          Physical Exam  Vitals reviewed. Constitutional:       General: He is not in acute distress.      Appearance: He is not ill-appearing, toxic-appearing or diaphoretic. HENT:      Head: Normocephalic. Mouth/Throat:      Mouth: Mucous membranes are moist.   Eyes:      General: No scleral icterus. Conjunctiva/sclera: Conjunctivae normal.   Cardiovascular:      Rate and Rhythm: Normal rate and regular rhythm. Heart sounds: Normal heart sounds. No murmur heard. Pulmonary:      Effort: Pulmonary effort is normal. No respiratory distress. Breath sounds: Normal breath sounds. No stridor. No wheezing, rhonchi or rales. Chest:      Chest wall: No tenderness. Abdominal:      General: Bowel sounds are normal.      Palpations: Abdomen is soft. Tenderness: There is no abdominal tenderness. There is no guarding. Musculoskeletal:      Cervical back: Neck supple. No rigidity. Right lower leg: No edema. Left lower leg: No edema. Lymphadenopathy:      Cervical: No cervical adenopathy. Skin:     Coloration: Skin is not jaundiced or pale. Findings: No rash. Neurological:      Mental Status: He is alert and oriented to person, place, and time. Gait: Gait normal.   Psychiatric:         Mood and Affect: Mood normal.         Behavior: Behavior normal.         Thought Content: Thought content normal.         Judgment: Judgment normal.         I spent minutes of time taking care of this patient with complex medical issues. The majority of this time was spent directly with the patient counseling as well as coordinating care.

## 2023-10-22 NOTE — ASSESSMENT & PLAN NOTE
He is obese and understands the need for weight reduction. He understands that weight reduction can significantly improve sleep apnea.

## 2023-10-22 NOTE — ASSESSMENT & PLAN NOTE
He recently had COVID-19 faction. He was treated with Paxlovid. He was also given steroid therapy. Currently he is feeling better. He has occasional cough. His chest was clear to auscultation.

## 2023-10-22 NOTE — ASSESSMENT & PLAN NOTE
Edilson Hooker was diagnosed with severe obstructive sleep apnea AHI 72.6 associated with severe sleep fragmentation and oxygen desaturation during sleep and a diagnostic overnight polysomnogram performed on November 7, 2013. He subsequently underwent a CPAP titration study on December 16, 2013 and was started on CPAP therapy. Currently he is on auto CPAP therapy with a minimum pressure of 4 cm of water and maximum 20 cm of water. His average pressure is 11 cm of water now. His residual AHI was low and leak minimal.  He is getting clinical benefit from CPAP therapy and is very motivated to continue. The CPAP therapy is medically necessary for him. I have advised him to continue as before. I had a long discussion with him and his wife and I have answered all their questions. They stated that they are planning to move to [place] soon.